# Patient Record
Sex: MALE | Race: WHITE
[De-identification: names, ages, dates, MRNs, and addresses within clinical notes are randomized per-mention and may not be internally consistent; named-entity substitution may affect disease eponyms.]

---

## 2016-08-23 VITALS — SYSTOLIC BLOOD PRESSURE: 114 MMHG | DIASTOLIC BLOOD PRESSURE: 60 MMHG

## 2020-10-26 ENCOUNTER — HOSPITAL ENCOUNTER (OUTPATIENT)
Dept: HOSPITAL 61 - KCIC CT | Age: 60
End: 2020-10-26
Attending: FAMILY MEDICINE
Payer: COMMERCIAL

## 2020-10-26 DIAGNOSIS — K76.0: ICD-10-CM

## 2020-10-26 DIAGNOSIS — N40.0: Primary | ICD-10-CM

## 2020-10-26 PROCEDURE — 74177 CT ABD & PELVIS W/CONTRAST: CPT

## 2020-10-26 NOTE — [facility]
EXAM: Abdomen and pelvis CT with intravenous contrast.

 

HISTORY: Left lower quadrant pain.

 

TECHNIQUE: Computed tomographic images of the abdomen and pelvis were 

obtained following the administration of intravenous contrast. Multiplanar

reformatting was performed.

 

*One or more of the following individualized dose reduction techniques 

were utilized for this examination:  

1. Automated exposure control.  

2. Adjustment of the mA and/or kV according to patient size.  

3. Use of iterative reconstruction technique.

 

COMPARISON: None.

 

FINDINGS: Evaluation of the lower thorax demonstrates no infiltrate or 

pleural effusion. There are calcified granulomas within the right lower 

lobe. The heart is normal in size. There is hepatic steatosis. No focal 

hepatic lesion is seen. The gallbladder and pancreas are unremarkable. 

There is a splenule adjacent to an otherwise unremarkable spleen. The 

stomach and adrenal glands are unremarkable. The kidneys are unremarkable.

 

There is no appendicitis. There is no bowel obstruction. There is no 

abnormal bowel wall thickening. There is slight deformation of the bladder

base due to an enlarged prostate. The aorta is normal in caliber. There is

no lymphadenopathy. No suspicious osseous lesion is seen.

 

IMPRESSION: 

1. Hepatic steatosis.

2. Prostatomegaly.

3. No convincing acute abdominal or pelvic finding.

 

Electronically signed by: Patricia Aly MD (10/26/2020 10:00 AM) 

[id number]